# Patient Record
Sex: FEMALE | Race: WHITE | URBAN - METROPOLITAN AREA
[De-identification: names, ages, dates, MRNs, and addresses within clinical notes are randomized per-mention and may not be internally consistent; named-entity substitution may affect disease eponyms.]

---

## 2022-04-21 ENCOUNTER — OFFICE VISIT (OUTPATIENT)
Dept: URBAN - METROPOLITAN AREA CLINIC 30 | Facility: CLINIC | Age: 27
End: 2022-04-21
Payer: MEDICARE

## 2022-04-21 DIAGNOSIS — H40.011 OPEN ANGLE WITH BORDERLINE FINDINGS, LOW RISK, RIGHT EYE: Primary | ICD-10-CM

## 2022-04-21 DIAGNOSIS — H27.01 APHAKIA, RIGHT EYE: ICD-10-CM

## 2022-04-21 DIAGNOSIS — H53.041 AMBLYOPIA SUSPECT, RIGHT EYE: ICD-10-CM

## 2022-04-21 DIAGNOSIS — H50.331 INTERMITTENT MONOCULAR EXOTROPIA, RIGHT EYE: ICD-10-CM

## 2022-04-21 DIAGNOSIS — H17.9 CORNEAL SCAR: ICD-10-CM

## 2022-04-21 PROCEDURE — 92025 CPTRIZED CORNEAL TOPOGRAPHY: CPT | Performed by: STUDENT IN AN ORGANIZED HEALTH CARE EDUCATION/TRAINING PROGRAM

## 2022-04-21 PROCEDURE — 76514 ECHO EXAM OF EYE THICKNESS: CPT | Performed by: STUDENT IN AN ORGANIZED HEALTH CARE EDUCATION/TRAINING PROGRAM

## 2022-04-21 PROCEDURE — 92133 CPTRZD OPH DX IMG PST SGM ON: CPT | Performed by: STUDENT IN AN ORGANIZED HEALTH CARE EDUCATION/TRAINING PROGRAM

## 2022-04-21 PROCEDURE — 92004 COMPRE OPH EXAM NEW PT 1/>: CPT | Performed by: STUDENT IN AN ORGANIZED HEALTH CARE EDUCATION/TRAINING PROGRAM

## 2022-04-21 ASSESSMENT — INTRAOCULAR PRESSURE
OD: 22
OS: 15
OS: 14
OD: 23

## 2022-04-21 ASSESSMENT — VISUAL ACUITY
OS: 20/20
OD: 20/30

## 2022-04-21 ASSESSMENT — KERATOMETRY
OS: 44.62
OD: 43.91

## 2022-04-21 NOTE — IMPRESSION/PLAN
Impression: Open angle with borderline findings, low risk, right eye: H40.011.
-- ddx OHTN
-- risk factors: IOP, h/o trauma -- Pach 606/546
-- no fhx glc Plan: IOP asymmetric & elevated OD  @22/14 today with thick pach Baseline OCT RNFL
   OD 82um / bdln temp & inf, wnl sup & johnny / consistent with thinning on GCA
   OS 92um / wnl 360 Pt ed condition, findings and risk for glaucoma which can cause irreversible vision loss. Discussed importance of f/u care. No treatment necessary at this time. 
RTC for gonio / baseline photos / visual field

## 2022-04-21 NOTE — IMPRESSION/PLAN
Impression: Amblyopia suspect, right eye: H53.041.
-- BCVA today 20/30 with refraction, amblyopia vs irreg astigmatism limiting VA today Plan: May contribute to reduced vision, pt expresses understanding

## 2022-04-21 NOTE — IMPRESSION/PLAN
Impression: Aphakia, right eye: H27.01.
-- After traumatic (penetrating?) corneal injury at age 9 or 8
-- prev care with Dr Dutch Piper until ~age 10 Plan: Pt ed role in asymmetric rx and asthenopia with glasses

## 2022-04-21 NOTE — IMPRESSION/PLAN
Impression: Corneal scar: H17.9.
-- baseline k-topography today: irreg & flat johnny OD / normal OS Plan: Pt ed contribution to reduced vision, pt expresses understanding Recommend referral to [facility] for CL fit; briefly dwp soft CL vs rgp/sclerals

## 2022-04-21 NOTE — IMPRESSION/PLAN
Impression: Intermittent monocular exotropia, right eye: H50.331.
-- high frequency, but intermitent Plan: Monitor, unlikely to contribute to reduced [place]